# Patient Record
Sex: FEMALE | Race: WHITE | NOT HISPANIC OR LATINO | Employment: FULL TIME | URBAN - METROPOLITAN AREA
[De-identification: names, ages, dates, MRNs, and addresses within clinical notes are randomized per-mention and may not be internally consistent; named-entity substitution may affect disease eponyms.]

---

## 2021-08-11 RX ORDER — LEVOTHYROXINE SODIUM 0.12 MG/1
TABLET ORAL DAILY
COMMUNITY
Start: 2021-07-02 | End: 2021-08-11 | Stop reason: SDUPTHER

## 2021-08-11 RX ORDER — LEVOTHYROXINE SODIUM 0.12 MG/1
125 TABLET ORAL DAILY
Qty: 30 TABLET | Refills: 0 | Status: SHIPPED | OUTPATIENT
Start: 2021-08-11 | End: 2021-08-25 | Stop reason: SDUPTHER

## 2021-08-25 ENCOUNTER — LAB (OUTPATIENT)
Dept: LAB | Facility: HOSPITAL | Age: 51
End: 2021-08-25

## 2021-08-25 ENCOUNTER — OFFICE VISIT (OUTPATIENT)
Dept: ENDOCRINOLOGY | Facility: CLINIC | Age: 51
End: 2021-08-25

## 2021-08-25 VITALS
OXYGEN SATURATION: 98 % | BODY MASS INDEX: 27.11 KG/M2 | DIASTOLIC BLOOD PRESSURE: 62 MMHG | HEIGHT: 63 IN | SYSTOLIC BLOOD PRESSURE: 102 MMHG | HEART RATE: 55 BPM | WEIGHT: 153 LBS

## 2021-08-25 DIAGNOSIS — E89.0 POSTSURGICAL HYPOTHYROIDISM: Primary | Chronic | ICD-10-CM

## 2021-08-25 DIAGNOSIS — E89.0 POSTSURGICAL HYPOTHYROIDISM: Chronic | ICD-10-CM

## 2021-08-25 PROCEDURE — 99213 OFFICE O/P EST LOW 20 MIN: CPT | Performed by: PHYSICIAN ASSISTANT

## 2021-08-25 PROCEDURE — 84443 ASSAY THYROID STIM HORMONE: CPT

## 2021-08-25 RX ORDER — PAROXETINE 7.5 MG/1
7.5 CAPSULE ORAL DAILY
COMMUNITY
Start: 2021-08-17

## 2021-08-25 RX ORDER — LEVOTHYROXINE SODIUM 0.12 MG/1
125 TABLET ORAL DAILY
Qty: 90 TABLET | Refills: 3 | Status: SHIPPED | OUTPATIENT
Start: 2021-08-25 | End: 2022-09-12 | Stop reason: SDUPTHER

## 2021-08-25 NOTE — PROGRESS NOTES
"     Office Note      Date: 2021  Patient Name: Lona Raymundo  MRN: [id number]  : 1970    Chief Complaint   Patient presents with   • Thyroid Problem       History of Present Illness:   Lona Raymundo is a 51 y.o. female who presents today for follow up on postsurgical hypothyroidism. She had total thyroidectomy in  for multinodular goiter, pathology showed chronic thyroiditis.  She remains on levothyroxine 125 mcg daily.  Takes this every night 2-3 hours after dinner. She reports that she feels okay. She denies symptoms of hypo or hyperthyroidism at this time. She has not noticed any changes in her neck. She denies any compressive symptoms.    Subjective      Review of Systems:  Review of Systems   Constitutional: Negative.    HENT: Negative.    Cardiovascular: Negative.    Gastrointestinal: Negative.    Endocrine: Negative.      Past Medical History:   Diagnosis Date   • Factor V Leiden mutation (CMS/HCC)    • Nontoxic multinodular goiter    • Postsurgical hypothyroidism    • Pulmonary embolism (CMS/HCC)      Past Surgical History:   Procedure Laterality Date   • TOTAL THYROIDECTOMY       The following portions of the patient's history were reviewed and updated as appropriate: allergies, current medications, past family history, past medical history, past social history, past surgical history and problem list.    Objective     Vitals:    21 1523   BP: 102/62   Pulse: 55   SpO2: 98%   Weight: 69.4 kg (153 lb)   Height: 160 cm (63\")   PainSc: 0-No pain     Body mass index is 27.1 kg/m².    Physical Exam  Vitals reviewed.   Constitutional:       General: She is not in acute distress.  Neck:      Comments: No palpable thyroid tissue.  Lymphadenopathy:      Cervical: No cervical adenopathy.   Neurological:      Mental Status: She is alert and oriented to person, place, and time.   Psychiatric:         Attention and Perception: Attention normal.         Mood and Affect: Mood " normal.         Current Outpatient Medications   Medication Instructions   • levothyroxine (SYNTHROID, LEVOTHROID) 125 mcg, Oral, Daily   • PARoxetine Mesylate 7.5 mg, Oral, Daily   • rivaroxaban (XARELTO) 20 mg, Oral, Daily       Assessment / Plan      Assessment & Plan:  1. Postsurgical hypothyroidism  Clinically euthyroid. Check TSH today. Will notify her of results and if dose adjustment is indicated.  - TSH; Future  - levothyroxine (SYNTHROID, LEVOTHROID) 125 MCG tablet; Take 1 tablet by mouth Daily.  Dispense: 90 tablet; Refill: 3      Return in about 1 year (around 8/25/2022) for recheck with TFTs.    LILY Bailey  Endocrinology  08/25/2021

## 2021-08-26 LAB — TSH SERPL DL<=0.05 MIU/L-ACNC: 0.21 UIU/ML (ref 0.27–4.2)

## 2022-09-12 DIAGNOSIS — E89.0 POSTSURGICAL HYPOTHYROIDISM: Chronic | ICD-10-CM

## 2022-09-12 RX ORDER — LEVOTHYROXINE SODIUM 0.12 MG/1
125 TABLET ORAL DAILY
Qty: 90 TABLET | Refills: 0 | Status: SHIPPED | OUTPATIENT
Start: 2022-09-12 | End: 2022-12-12

## 2022-09-12 NOTE — TELEPHONE ENCOUNTER
Last office visit 08/25/2021.  Follow up  Scheduled for 12/20/2022.   Spontaneous, unlabored and symmetrical

## 2022-09-12 NOTE — TELEPHONE ENCOUNTER
PT CALLED REQUESTING A REFILL OF LEVOTHYROXINE TO BE SENT IN TO [facility] IN [place], [place].     PT IS OUT OF MEDS

## 2022-09-13 DIAGNOSIS — E89.0 POSTSURGICAL HYPOTHYROIDISM: Chronic | ICD-10-CM

## 2022-09-13 RX ORDER — LEVOTHYROXINE SODIUM 0.12 MG/1
125 TABLET ORAL DAILY
Qty: 90 TABLET | Refills: 0 | Status: CANCELLED | OUTPATIENT
Start: 2022-09-13

## 2022-12-10 DIAGNOSIS — E89.0 POSTSURGICAL HYPOTHYROIDISM: Chronic | ICD-10-CM

## 2022-12-12 RX ORDER — LEVOTHYROXINE SODIUM 0.12 MG/1
TABLET ORAL
Qty: 30 TABLET | Refills: 0 | Status: SHIPPED | OUTPATIENT
Start: 2022-12-12 | End: 2022-12-13 | Stop reason: SDUPTHER

## 2022-12-13 DIAGNOSIS — E89.0 POSTSURGICAL HYPOTHYROIDISM: Chronic | ICD-10-CM

## 2022-12-13 RX ORDER — LEVOTHYROXINE SODIUM 0.12 MG/1
125 TABLET ORAL DAILY
Qty: 30 TABLET | Refills: 1 | Status: SHIPPED | OUTPATIENT
Start: 2022-12-13 | End: 2023-01-27 | Stop reason: SDUPTHER

## 2023-01-26 NOTE — PROGRESS NOTES
"Chief complaint/Reason for consult: Hypothyroidism    HPI: Ms. Raymundo is a 52-year-old female with postsurgical hypothyroidism status post thyroidectomy for multinodular goiter who comes for follow-up of hypothyroidism.  She was last seen on 8/25/2021 by different provider and reports since that time no significant changes in her health other than going through menopause.  She is not on hormone replacement therapy due to history of DVTs and factor V Leiden.    # Hypothyroidism, postsurgical  - Thyroidectomy done in 2011 for multinodular goiter   - Is currently on levothyroxine 125 mcg daily  - Reports good compliance with levothyroxine and takes it at the same time every day, on an empty stomach, and not with hot beverages  - Denies taking biotin supplement  - Denies changes in fatigue, constipation, edema  - Had labs done through work TSH 0.252 done 11/3/2022 while taking levothyroxine 125 mcg daily- Has a history of low bone density for age, previously took Alendronate for 2-3 years in her 30s     Past medical history, past surgical history, family history and social history reviewed within this encounter.     Review of Systems   Constitutional: Negative for activity change and unexpected weight change.   HENT: Negative for trouble swallowing.    Eyes: Negative for visual disturbance.   Respiratory: Negative for shortness of breath.    Cardiovascular: Negative for palpitations.   Gastrointestinal: Negative for abdominal pain.   Endocrine: Negative for cold intolerance and heat intolerance.   Musculoskeletal: Negative for arthralgias.   Skin: Negative for rash.   Neurological: Negative for dizziness.   Psychiatric/Behavioral: Negative for agitation and confusion.        /62 (BP Location: Left arm, Patient Position: Sitting, Cuff Size: Adult)   Pulse 94   Ht 160 cm (63\")   Wt 69.9 kg (154 lb)   SpO2 99%   BMI 27.28 kg/m²      Physical Exam  Vitals reviewed.   Constitutional:       General: She is not " in acute distress.     Appearance: She is normal weight.   Eyes:      Conjunctiva/sclera: Conjunctivae normal.   Neck:      Comments: Midline surgical scar well-healed  Cardiovascular:      Rate and Rhythm: Normal rate.   Pulmonary:      Effort: Pulmonary effort is normal.   Abdominal:      Tenderness: There is no guarding.   Musculoskeletal:         General: Normal range of motion.   Skin:     General: Skin is warm and dry.   Neurological:      Mental Status: She is alert and oriented to person, place, and time.   Psychiatric:         Mood and Affect: Mood normal.         Behavior: Behavior normal.         Thought Content: Thought content normal.          Labs and images reviewed as noted in the HPI    Assessment and plan:    Diagnoses and all orders for this visit:    1. Postoperative hypothyroidism (Primary)  Assessment & Plan:  -Given her history of low bone density for age, recommend reduction of levothyroxine dose to achieve a normal TSH as she is at risk for osteoporosis with persistent mild hyper thyroidism  -Reduce the dose of levothyroxine 112 mcg daily  -Repeat TSH and free T4 in about 2 months    Orders:  -     TSH; Future  -     T4, Free; Future  -     levothyroxine (SYNTHROID, LEVOTHROID) 112 MCG tablet; Take 1 tablet by mouth Daily.  Dispense: 30 tablet; Refill: 1    2. Low bone density for age  Assessment & Plan:  -Recommend reduction in levothyroxine dose as noted above  -Recommend patient have repeat DXA done, her primary care physician will order this  -Recommend she take 2000 IUs of vitamin D daily  -We will get 25-OH Vitamin D level with next labs  -Recommend continue to get 3-4 servings of dairy daily  -Recommend continued regular weightbearing activities    Orders:  -     Vitamin D 25 Hydroxy; Future    3. Postsurgical hypothyroidism       Return in about 1 year (around 1/27/2024) for hypothyroidism .     I spent 20 minutes caring for Lona on this date of service. This time includes time  spent by me in the following activities: preparing for the visit, reviewing tests, performing a medically appropriate examination and/or evaluation, counseling and educating the patient/family/caregiver and documenting information in the medical record     Electronically signed by: Kyle S Rosenstein, MD

## 2023-01-27 ENCOUNTER — OFFICE VISIT (OUTPATIENT)
Dept: ENDOCRINOLOGY | Facility: CLINIC | Age: 53
End: 2023-01-27
Payer: COMMERCIAL

## 2023-01-27 VITALS
DIASTOLIC BLOOD PRESSURE: 62 MMHG | OXYGEN SATURATION: 99 % | SYSTOLIC BLOOD PRESSURE: 102 MMHG | HEIGHT: 63 IN | HEART RATE: 94 BPM | BODY MASS INDEX: 27.29 KG/M2 | WEIGHT: 154 LBS

## 2023-01-27 DIAGNOSIS — M85.9 LOW BONE DENSITY FOR AGE: ICD-10-CM

## 2023-01-27 DIAGNOSIS — E89.0 POSTOPERATIVE HYPOTHYROIDISM: Primary | ICD-10-CM

## 2023-01-27 DIAGNOSIS — E89.0 POSTSURGICAL HYPOTHYROIDISM: Chronic | ICD-10-CM

## 2023-01-27 PROCEDURE — 99214 OFFICE O/P EST MOD 30 MIN: CPT | Performed by: HOSPITALIST

## 2023-01-27 RX ORDER — LEVOTHYROXINE SODIUM 112 UG/1
112 TABLET ORAL DAILY
Qty: 30 TABLET | Refills: 1 | Status: SHIPPED | OUTPATIENT
Start: 2023-01-27 | End: 2023-03-30

## 2023-01-27 NOTE — ASSESSMENT & PLAN NOTE
-Given her history of low bone density for age, recommend reduction of levothyroxine dose to achieve a normal TSH as she is at risk for osteoporosis with persistent mild hyper thyroidism  -Reduce the dose of levothyroxine 112 mcg daily  -Repeat TSH and free T4 in about 2 months

## 2023-03-30 DIAGNOSIS — E89.0 POSTOPERATIVE HYPOTHYROIDISM: ICD-10-CM

## 2023-03-30 RX ORDER — LEVOTHYROXINE SODIUM 112 UG/1
TABLET ORAL
Qty: 90 TABLET | Refills: 3 | Status: SHIPPED | OUTPATIENT
Start: 2023-03-30

## 2024-03-25 DIAGNOSIS — E89.0 POSTOPERATIVE HYPOTHYROIDISM: ICD-10-CM

## 2024-03-25 RX ORDER — LEVOTHYROXINE SODIUM 112 UG/1
TABLET ORAL
Qty: 90 TABLET | Refills: 3 | OUTPATIENT
Start: 2024-03-25

## 2024-06-06 NOTE — ASSESSMENT & PLAN NOTE
-Recommend reduction in levothyroxine dose as noted above  -Recommend patient have repeat DXA done, her primary care physician will order this  -Recommend she take 2000 IUs of vitamin D daily  -We will get 25-OH Vitamin D level with next labs  -Recommend continue to get 3-4 servings of dairy daily  -Recommend continued regular weightbearing activities   (1) Outpatient Area